# Patient Record
Sex: MALE | Race: WHITE | NOT HISPANIC OR LATINO | Employment: OTHER | URBAN - NONMETROPOLITAN AREA
[De-identification: names, ages, dates, MRNs, and addresses within clinical notes are randomized per-mention and may not be internally consistent; named-entity substitution may affect disease eponyms.]

---

## 2017-02-14 ENCOUNTER — OFFICE VISIT (OUTPATIENT)
Dept: WOUND CARE | Facility: HOSPITAL | Age: 82
End: 2017-02-14
Attending: PLASTIC SURGERY

## 2017-02-14 ENCOUNTER — LAB REQUISITION (OUTPATIENT)
Dept: LAB | Facility: HOSPITAL | Age: 82
End: 2017-02-14

## 2017-02-14 DIAGNOSIS — L02.611 CUTANEOUS ABSCESS OF RIGHT FOOT: ICD-10-CM

## 2017-02-14 PROCEDURE — 87147 CULTURE TYPE IMMUNOLOGIC: CPT | Performed by: PLASTIC SURGERY

## 2017-02-14 PROCEDURE — 87186 SC STD MICRODIL/AGAR DIL: CPT | Performed by: PLASTIC SURGERY

## 2017-02-14 PROCEDURE — 87205 SMEAR GRAM STAIN: CPT | Performed by: PLASTIC SURGERY

## 2017-02-14 PROCEDURE — 82962 GLUCOSE BLOOD TEST: CPT | Performed by: PLASTIC SURGERY

## 2017-02-14 PROCEDURE — 87070 CULTURE OTHR SPECIMN AEROBIC: CPT | Performed by: PLASTIC SURGERY

## 2017-02-14 PROCEDURE — 87077 CULTURE AEROBIC IDENTIFY: CPT | Performed by: PLASTIC SURGERY

## 2017-02-14 PROCEDURE — G0463 HOSPITAL OUTPT CLINIC VISIT: HCPCS

## 2017-02-15 LAB — GLUCOSE BLDC GLUCOMTR-MCNC: 135 MG/DL (ref 70–130)

## 2017-02-16 LAB
BACTERIA SPEC AEROBE CULT: ABNORMAL
GRAM STN SPEC: ABNORMAL
GRAM STN SPEC: ABNORMAL

## 2017-02-22 ENCOUNTER — APPOINTMENT (OUTPATIENT)
Dept: WOUND CARE | Facility: HOSPITAL | Age: 82
End: 2017-02-22
Attending: PLASTIC SURGERY

## 2017-02-22 PROCEDURE — 12032 INTMD RPR S/A/T/EXT 2.6-7.5: CPT

## 2017-03-01 ENCOUNTER — APPOINTMENT (OUTPATIENT)
Dept: WOUND CARE | Facility: HOSPITAL | Age: 82
End: 2017-03-01
Attending: PLASTIC SURGERY

## 2017-03-01 PROCEDURE — 12032 INTMD RPR S/A/T/EXT 2.6-7.5: CPT

## 2017-03-08 ENCOUNTER — APPOINTMENT (OUTPATIENT)
Dept: WOUND CARE | Facility: HOSPITAL | Age: 82
End: 2017-03-08
Attending: PLASTIC SURGERY

## 2017-03-15 ENCOUNTER — APPOINTMENT (OUTPATIENT)
Dept: WOUND CARE | Facility: HOSPITAL | Age: 82
End: 2017-03-15
Attending: PLASTIC SURGERY

## 2017-03-15 PROCEDURE — 11055 PARING/CUTG B9 HYPRKER LES 1: CPT

## 2017-03-22 ENCOUNTER — APPOINTMENT (OUTPATIENT)
Dept: WOUND CARE | Facility: HOSPITAL | Age: 82
End: 2017-03-22
Attending: PLASTIC SURGERY

## 2017-03-30 ENCOUNTER — APPOINTMENT (OUTPATIENT)
Dept: WOUND CARE | Facility: HOSPITAL | Age: 82
End: 2017-03-30
Attending: PLASTIC SURGERY

## 2017-03-31 ENCOUNTER — APPOINTMENT (OUTPATIENT)
Dept: WOUND CARE | Facility: HOSPITAL | Age: 82
End: 2017-03-31
Attending: PLASTIC SURGERY

## 2017-03-31 ENCOUNTER — TRANSCRIBE ORDERS (OUTPATIENT)
Dept: CARDIAC SURGERY | Facility: CLINIC | Age: 82
End: 2017-03-31

## 2017-03-31 DIAGNOSIS — I79.8 OTHER DISORDERS OF ARTERIES, ARTERIOLES AND CAPILLARIES IN DISEASES CLASSIFIED ELSEWHERE (HCC): Primary | ICD-10-CM

## 2017-03-31 PROCEDURE — G0463 HOSPITAL OUTPT CLINIC VISIT: HCPCS

## 2017-03-31 PROCEDURE — 99212 OFFICE O/P EST SF 10 MIN: CPT | Performed by: THORACIC SURGERY (CARDIOTHORACIC VASCULAR SURGERY)

## 2017-04-05 ENCOUNTER — APPOINTMENT (OUTPATIENT)
Dept: WOUND CARE | Facility: HOSPITAL | Age: 82
End: 2017-04-05
Attending: PLASTIC SURGERY

## 2017-04-05 ENCOUNTER — HOSPITAL ENCOUNTER (OUTPATIENT)
Dept: GENERAL RADIOLOGY | Facility: HOSPITAL | Age: 82
Discharge: HOME | End: 2017-04-05
Admitting: THORACIC SURGERY (CARDIOTHORACIC VASCULAR SURGERY)

## 2017-04-05 DIAGNOSIS — M86.071 ACUTE HEMATOGENOUS OSTEOMYELITIS OF RIGHT FOOT (HCC): ICD-10-CM

## 2017-04-05 PROCEDURE — G0463 HOSPITAL OUTPT CLINIC VISIT: HCPCS

## 2017-04-05 PROCEDURE — 73630 X-RAY EXAM OF FOOT: CPT

## 2017-04-12 ENCOUNTER — APPOINTMENT (OUTPATIENT)
Dept: WOUND CARE | Facility: HOSPITAL | Age: 82
End: 2017-04-12
Attending: PLASTIC SURGERY

## 2017-04-19 ENCOUNTER — APPOINTMENT (OUTPATIENT)
Dept: WOUND CARE | Facility: HOSPITAL | Age: 82
End: 2017-04-19
Attending: PLASTIC SURGERY

## 2017-04-19 PROCEDURE — G0463 HOSPITAL OUTPT CLINIC VISIT: HCPCS

## 2017-04-26 ENCOUNTER — APPOINTMENT (OUTPATIENT)
Dept: WOUND CARE | Facility: HOSPITAL | Age: 82
End: 2017-04-26
Attending: PLASTIC SURGERY

## 2017-04-26 PROCEDURE — G0463 HOSPITAL OUTPT CLINIC VISIT: HCPCS

## 2017-05-03 ENCOUNTER — OFFICE VISIT (OUTPATIENT)
Dept: WOUND CARE | Facility: HOSPITAL | Age: 82
End: 2017-05-03
Attending: PLASTIC SURGERY

## 2017-05-03 PROCEDURE — G0463 HOSPITAL OUTPT CLINIC VISIT: HCPCS

## 2017-05-10 ENCOUNTER — APPOINTMENT (OUTPATIENT)
Dept: WOUND CARE | Facility: HOSPITAL | Age: 82
End: 2017-05-10
Attending: PLASTIC SURGERY

## 2017-05-10 PROCEDURE — G0463 HOSPITAL OUTPT CLINIC VISIT: HCPCS

## 2017-05-17 ENCOUNTER — APPOINTMENT (OUTPATIENT)
Dept: WOUND CARE | Facility: HOSPITAL | Age: 82
End: 2017-05-17
Attending: PLASTIC SURGERY

## 2017-05-17 PROCEDURE — G0463 HOSPITAL OUTPT CLINIC VISIT: HCPCS

## 2017-05-23 ENCOUNTER — APPOINTMENT (OUTPATIENT)
Dept: WOUND CARE | Facility: HOSPITAL | Age: 82
End: 2017-05-23
Attending: PLASTIC SURGERY

## 2017-05-23 PROCEDURE — G0463 HOSPITAL OUTPT CLINIC VISIT: HCPCS

## 2017-06-06 ENCOUNTER — APPOINTMENT (OUTPATIENT)
Dept: WOUND CARE | Facility: HOSPITAL | Age: 82
End: 2017-06-06
Attending: PLASTIC SURGERY

## 2017-06-06 PROCEDURE — G0463 HOSPITAL OUTPT CLINIC VISIT: HCPCS

## 2017-06-20 ENCOUNTER — APPOINTMENT (OUTPATIENT)
Dept: WOUND CARE | Facility: HOSPITAL | Age: 82
End: 2017-06-20
Attending: PLASTIC SURGERY

## 2017-06-29 ENCOUNTER — APPOINTMENT (OUTPATIENT)
Dept: WOUND CARE | Facility: HOSPITAL | Age: 82
End: 2017-06-29
Attending: PLASTIC SURGERY

## 2017-06-29 ENCOUNTER — HOSPITAL ENCOUNTER (OUTPATIENT)
Dept: GENERAL RADIOLOGY | Facility: HOSPITAL | Age: 82
Discharge: HOME | End: 2017-06-29
Admitting: PLASTIC SURGERY

## 2017-06-29 ENCOUNTER — LAB REQUISITION (OUTPATIENT)
Dept: LAB | Facility: HOSPITAL | Age: 82
End: 2017-06-29

## 2017-06-29 DIAGNOSIS — L02.611 ABSCESS OF FIFTH TOE OF RIGHT FOOT: ICD-10-CM

## 2017-06-29 DIAGNOSIS — E11.621 TYPE 2 DIABETES MELLITUS WITH FOOT ULCER (CODE) (HCC): ICD-10-CM

## 2017-06-29 PROCEDURE — 87070 CULTURE OTHR SPECIMN AEROBIC: CPT | Performed by: PLASTIC SURGERY

## 2017-06-29 PROCEDURE — 87077 CULTURE AEROBIC IDENTIFY: CPT | Performed by: PLASTIC SURGERY

## 2017-06-29 PROCEDURE — 87186 SC STD MICRODIL/AGAR DIL: CPT | Performed by: PLASTIC SURGERY

## 2017-06-29 PROCEDURE — 87205 SMEAR GRAM STAIN: CPT | Performed by: PLASTIC SURGERY

## 2017-06-29 PROCEDURE — 87147 CULTURE TYPE IMMUNOLOGIC: CPT | Performed by: PLASTIC SURGERY

## 2017-06-29 PROCEDURE — 73630 X-RAY EXAM OF FOOT: CPT

## 2017-07-04 LAB
BACTERIA SPEC AEROBE CULT: ABNORMAL
GRAM STN SPEC: ABNORMAL
GRAM STN SPEC: ABNORMAL

## 2017-07-05 ENCOUNTER — APPOINTMENT (OUTPATIENT)
Dept: WOUND CARE | Facility: HOSPITAL | Age: 82
End: 2017-07-05
Attending: PLASTIC SURGERY

## 2017-07-12 ENCOUNTER — OFFICE VISIT (OUTPATIENT)
Dept: WOUND CARE | Facility: HOSPITAL | Age: 82
End: 2017-07-12
Attending: PLASTIC SURGERY

## 2017-07-12 DIAGNOSIS — R00.1 BRADYCARDIA: Primary | ICD-10-CM

## 2017-07-12 DIAGNOSIS — I95.9 HYPOTENSION, UNSPECIFIED: ICD-10-CM

## 2017-07-12 LAB — GLUCOSE BLDC GLUCOMTR-MCNC: 150 MG/DL (ref 70–130)

## 2017-07-12 PROCEDURE — 82962 GLUCOSE BLOOD TEST: CPT | Performed by: PLASTIC SURGERY

## 2017-07-12 PROCEDURE — G0463 HOSPITAL OUTPT CLINIC VISIT: HCPCS

## 2017-07-12 PROCEDURE — 93005 ELECTROCARDIOGRAM TRACING: CPT | Performed by: PLASTIC SURGERY

## 2017-07-12 PROCEDURE — 93010 ELECTROCARDIOGRAM REPORT: CPT | Performed by: INTERNAL MEDICINE

## 2017-07-26 ENCOUNTER — APPOINTMENT (OUTPATIENT)
Dept: WOUND CARE | Facility: HOSPITAL | Age: 82
End: 2017-07-26
Attending: PLASTIC SURGERY

## 2017-07-26 PROCEDURE — G0463 HOSPITAL OUTPT CLINIC VISIT: HCPCS

## 2017-08-09 ENCOUNTER — LAB REQUISITION (OUTPATIENT)
Dept: LAB | Facility: HOSPITAL | Age: 82
End: 2017-08-09

## 2017-08-09 ENCOUNTER — APPOINTMENT (OUTPATIENT)
Dept: WOUND CARE | Facility: HOSPITAL | Age: 82
End: 2017-08-09
Attending: PLASTIC SURGERY

## 2017-08-09 DIAGNOSIS — S91.104A: ICD-10-CM

## 2017-08-09 PROCEDURE — 87205 SMEAR GRAM STAIN: CPT | Performed by: PLASTIC SURGERY

## 2017-08-09 PROCEDURE — 11055 PARING/CUTG B9 HYPRKER LES 1: CPT

## 2017-08-09 PROCEDURE — 87070 CULTURE OTHR SPECIMN AEROBIC: CPT | Performed by: PLASTIC SURGERY

## 2017-08-14 LAB
BACTERIA SPEC AEROBE CULT: NORMAL
GRAM STN SPEC: NORMAL
GRAM STN SPEC: NORMAL

## 2017-08-22 ENCOUNTER — LAB REQUISITION (OUTPATIENT)
Dept: LAB | Facility: HOSPITAL | Age: 82
End: 2017-08-22

## 2017-08-22 ENCOUNTER — APPOINTMENT (OUTPATIENT)
Dept: WOUND CARE | Facility: HOSPITAL | Age: 82
End: 2017-08-22
Attending: PLASTIC SURGERY

## 2017-08-22 DIAGNOSIS — S91.104A: ICD-10-CM

## 2017-08-22 PROCEDURE — 87077 CULTURE AEROBIC IDENTIFY: CPT | Performed by: PLASTIC SURGERY

## 2017-08-22 PROCEDURE — G0463 HOSPITAL OUTPT CLINIC VISIT: HCPCS

## 2017-08-22 PROCEDURE — 87205 SMEAR GRAM STAIN: CPT | Performed by: PLASTIC SURGERY

## 2017-08-22 PROCEDURE — 87070 CULTURE OTHR SPECIMN AEROBIC: CPT | Performed by: PLASTIC SURGERY

## 2017-08-22 PROCEDURE — 87186 SC STD MICRODIL/AGAR DIL: CPT | Performed by: PLASTIC SURGERY

## 2017-08-22 PROCEDURE — 87147 CULTURE TYPE IMMUNOLOGIC: CPT | Performed by: PLASTIC SURGERY

## 2017-08-25 LAB
BACTERIA SPEC AEROBE CULT: ABNORMAL
BACTERIA SPEC AEROBE CULT: ABNORMAL
GRAM STN SPEC: ABNORMAL
GRAM STN SPEC: ABNORMAL

## 2017-09-05 ENCOUNTER — APPOINTMENT (OUTPATIENT)
Dept: WOUND CARE | Facility: HOSPITAL | Age: 82
End: 2017-09-05
Attending: PLASTIC SURGERY

## 2017-09-06 ENCOUNTER — APPOINTMENT (OUTPATIENT)
Dept: WOUND CARE | Facility: HOSPITAL | Age: 82
End: 2017-09-06
Attending: PLASTIC SURGERY

## 2017-09-07 ENCOUNTER — APPOINTMENT (OUTPATIENT)
Dept: WOUND CARE | Facility: HOSPITAL | Age: 82
End: 2017-09-07
Attending: PLASTIC SURGERY

## 2017-09-12 ENCOUNTER — OFFICE VISIT (OUTPATIENT)
Dept: PODIATRY | Facility: CLINIC | Age: 82
End: 2017-09-12

## 2017-09-12 VITALS
OXYGEN SATURATION: 98 % | BODY MASS INDEX: 27.16 KG/M2 | SYSTOLIC BLOOD PRESSURE: 174 MMHG | HEART RATE: 60 BPM | WEIGHT: 163 LBS | DIASTOLIC BLOOD PRESSURE: 84 MMHG | HEIGHT: 65 IN

## 2017-09-12 DIAGNOSIS — M20.41 HAMMER TOE OF RIGHT FOOT: ICD-10-CM

## 2017-09-12 DIAGNOSIS — L97.514: Primary | ICD-10-CM

## 2017-09-12 PROCEDURE — 99203 OFFICE O/P NEW LOW 30 MIN: CPT | Performed by: PODIATRIST

## 2017-09-12 PROCEDURE — 11042 DBRDMT SUBQ TIS 1ST 20SQCM/<: CPT | Performed by: PODIATRIST

## 2017-09-12 RX ORDER — POTASSIUM CHLORIDE 750 MG/1
CAPSULE, EXTENDED RELEASE ORAL
Refills: 2 | COMMUNITY
Start: 2017-08-24

## 2017-09-12 RX ORDER — LEVOTHYROXINE SODIUM 25 MCG
TABLET ORAL
Refills: 0 | COMMUNITY
Start: 2017-08-21

## 2017-09-12 RX ORDER — ALLOPURINOL 300 MG/1
TABLET ORAL
Refills: 5 | COMMUNITY
Start: 2017-08-21

## 2017-09-12 RX ORDER — CARVEDILOL 3.12 MG/1
TABLET ORAL
Refills: 5 | COMMUNITY
Start: 2017-08-21

## 2017-09-12 RX ORDER — AMIODARONE HYDROCHLORIDE 200 MG/1
TABLET ORAL
Refills: 5 | COMMUNITY
Start: 2017-08-28

## 2017-09-12 RX ORDER — WARFARIN SODIUM 2.5 MG/1
TABLET ORAL
Refills: 2 | COMMUNITY
Start: 2017-08-07

## 2017-09-12 NOTE — PROGRESS NOTES
"Lucien Stone  1/28/1932  85 y.o. male    9/12/17    Chief Complaint   Patient presents with   • Right Foot - Wound Check, Nail Problem   • Left Foot - Nail Problem           History of Present Illness    85-year-old male presents to clinic today with chief complaint of a nonhealing wound to his right third toe.  He states he has had the wound since February.  He has been being treated in the wound care center.  They have been doing debridements and dressings with antibiotic ointment.  He was referred here for shoe modifications to help heal the wound.  He also complains of a sore left great toe.  He believes he has an ingrowing toenail.  He has been bothering him for several weeks.  He has done nothing to treat it.  He has no other pedal complaints.         No past medical history on file.      No past surgical history on file.      No family history on file.      Social History     Social History   • Marital status: Unknown     Spouse name: N/A   • Number of children: N/A   • Years of education: N/A     Occupational History   • Not on file.     Social History Main Topics   • Smoking status: Never Smoker   • Smokeless tobacco: Not on file   • Alcohol use No   • Drug use: Not on file   • Sexual activity: Defer     Other Topics Concern   • Not on file     Social History Narrative   • No narrative on file         Current Outpatient Prescriptions   Medication Sig Dispense Refill   • allopurinol (ZYLOPRIM) 300 MG tablet TK 1 T PO QD  5   • amiodarone (PACERONE) 200 MG tablet TK 1 T PO D  5   • carvedilol (COREG) 3.125 MG tablet TK 1 T PO BID WITH MEALS  5   • potassium chloride (MICRO-K) 10 MEQ CR capsule TK ONE C PO  D WITH FOOD  2   • SYNTHROID 25 MCG tablet TK 1 T PO D  0   • warfarin (COUMADIN) 2.5 MG tablet TK 1 T PO QD UTD BY YOUR DOCTOR  2     No current facility-administered medications for this visit.          OBJECTIVE    /84  Pulse 60  Ht 65\" (165.1 cm)  Wt 163 lb (73.9 kg)  SpO2 98%  BMI 27.12 " kg/m2      Review of Systems   Constitutional: Negative for chills and fever.   Cardiovascular: Negative for chest pain.   Gastrointestinal: Negative for constipation, diarrhea, nausea and vomiting.   Skin: Ulcer right third toe   Musculoskeletal: Left great toe pain      Constitutional: well developed, well nourished    HEENT: Normocephalic and atraumatic, normal hearing    Respiratory: Non labored respirations noted    Cardiovascular:    DP/PT pulses non-palpable    CFT brisk  to all digits  Skin temp is warm to warm from proximal tibia to distal digits  No erythema or edema noted     Musculoskeletal:  Muscle strength is 5/5 for all muscle groups tested   ROM of the 1st MTP is full without pain or crepitus   ROM of the ankle joint is full without pain or crepitus    Mild tenderness palpation to the left hallux  Reducibly contracted digits 2 through 5 bilateral    Dermatological:   Nails 1-5 right and 2 through 5 left are within normal limits for length and thickness, left hallux nail is absent from previous nail avulsion    Webspaces 1-4 bilateral are clean, dry and intact.   No subcutaneous nodules or masses noted    Full thickness ulceration noted to the distal tip of the right third toe.  Ulcer measures 0.4 x 0.5 x 0.4 cm.  It probes to bone.  There is no surrounding erythema, purulent drainage or foul odor noted.    Neurological:   Protective sensation decreased  Sensation intact to light touch    DTR intact    Psychiatric: A&O x 3 with normal mood and affect. NAD.     Procedures        ASSESSMENT AND PLAN    Lucien was seen today for wound check, nail problem and nail problem.    Diagnoses and all orders for this visit:    Chronic ulcer of toe, right, with necrosis of bone    Hammer toe of right foot    - Comprehensive foot and ankle exam performed.   - Reviewed old records and imaging, erosion noted to the distal phalanx of the third digit on radiographs  - Debrided ulcer noted in objective with a #15 blade  down to subcutaneous tissue.  Post-debridement measurements are 0.4 x 0.5 x 0.4cm.  Ulcer was dressed with medihoney and a dsd. Pt to do daily dressing changes at home  - Dispensed crest pad to aid in offloading of the third digit.  Brief discussion was held with patient regarding surgical intervention for nonhealing ulcer to the third digit.  - All questions were answered and the patient is in agreement with the current treatment plan.  - RTC  1 week          This document has been electronically signed by Dennis Rose DPM on September 13, 2017 5:54 PM     9/13/2017  5:54 PM    EMR Dragon/Transcription disclaimer:   Much of this encounter note is an electronic transcription/translation of spoken language to printed text. The electronic translation of spoken language may permit erroneous, or at times, nonsensical words or phrases to be inadvertently transcribed; Although I have reviewed the note for such errors, some may still exist.

## 2017-09-26 ENCOUNTER — OFFICE VISIT (OUTPATIENT)
Dept: PODIATRY | Facility: CLINIC | Age: 82
End: 2017-09-26

## 2017-09-26 VITALS — WEIGHT: 163 LBS | BODY MASS INDEX: 27.16 KG/M2 | HEIGHT: 65 IN

## 2017-09-26 DIAGNOSIS — R09.89 ABSENT PEDAL PULSES: Primary | ICD-10-CM

## 2017-09-26 DIAGNOSIS — L97.514: ICD-10-CM

## 2017-09-26 PROCEDURE — 11042 DBRDMT SUBQ TIS 1ST 20SQCM/<: CPT | Performed by: PODIATRIST

## 2017-09-26 RX ORDER — ENALAPRIL MALEATE 10 MG/1
5 TABLET ORAL DAILY
COMMUNITY
Start: 2017-09-18

## 2017-09-26 NOTE — PROGRESS NOTES
"Lucien Stone  1/28/1932  85 y.o. male     Patient presents today for follow-up on his right 3rd toe.    9/26/17    Chief Complaint   Patient presents with   • Right Foot - Follow-up           History of Present Illness    Patient presents to clinic today accompanied by his nephew for follow-up for his right third digit ulcer.  They have been dressing the toe as instructed.  He is currently weightbearing in offloading surgical shoe.  He has no new pedal complaints.      No past medical history on file.      No past surgical history on file.      No family history on file.      Social History     Social History   • Marital status: Unknown     Spouse name: N/A   • Number of children: N/A   • Years of education: N/A     Occupational History   • Not on file.     Social History Main Topics   • Smoking status: Never Smoker   • Smokeless tobacco: Not on file   • Alcohol use No   • Drug use: Not on file   • Sexual activity: Defer     Other Topics Concern   • Not on file     Social History Narrative   • No narrative on file         Current Outpatient Prescriptions   Medication Sig Dispense Refill   • allopurinol (ZYLOPRIM) 300 MG tablet TK 1 T PO QD  5   • amiodarone (PACERONE) 200 MG tablet TK 1 T PO D  5   • carvedilol (COREG) 3.125 MG tablet TK 1 T PO BID WITH MEALS  5   • potassium chloride (MICRO-K) 10 MEQ CR capsule TK ONE C PO  D WITH FOOD  2   • SYNTHROID 25 MCG tablet TK 1 T PO D  0   • warfarin (COUMADIN) 2.5 MG tablet TK 1 T PO QD UTD BY YOUR DOCTOR  2   • enalapril (VASOTEC) 10 MG tablet Take 5 mg by mouth Daily. 1/2 tab daily       No current facility-administered medications for this visit.          OBJECTIVE    Ht 65\" (165.1 cm)  Wt 163 lb (73.9 kg)  BMI 27.12 kg/m2      Review of Systems   Constitutional: Negative for chills and fever.   Cardiovascular: Negative for chest pain.   Gastrointestinal: Negative for constipation, diarrhea, nausea and vomiting.   Skin: Ulcer right third toe       Constitutional: " well developed, well nourished    HEENT: Normocephalic and atraumatic, normal hearing    Respiratory: Non labored respirations noted    Cardiovascular:    DP/PT pulses non-palpable    CFT brisk  to all digits  Skin temp is warm to warm from proximal tibia to distal digits  No erythema or edema noted     Musculoskeletal:  Muscle strength is 5/5 for all muscle groups tested   ROM of the 1st MTP is full without pain or crepitus   ROM of the ankle joint is full without pain or crepitus    Mild tenderness palpation to the left hallux  Reducibly contracted digits 2 through 5 bilateral    Dermatological:   Webspaces 1-4 bilateral are clean, dry and intact.   No subcutaneous nodules or masses noted    Full thickness ulceration noted to the distal tip of the right third toe.  Ulcer measures 0.3 x 0.3 x 0.4 cm.  It probes to bone.  There is no surrounding erythema, purulent drainage or foul odor noted.    Neurological:   Protective sensation decreased  Sensation intact to light touch    DTR intact    Psychiatric: A&O x 3 with normal mood and affect. NAD.     Procedures        ASSESSMENT AND PLAN    Lucien was seen today for follow-up.    Diagnoses and all orders for this visit:    Absent pedal pulses  -     Doppler Ankle Brachial Index Single Level CAR; Future    Chronic ulcer of toe, right, with necrosis of bone    - Debrided ulcer noted in objective with a #15 blade down to subcutaneous tissue.  Post-debridement measurements are 0.4 x 0.5 x 0.4cm.  Ulcer was dressed with medihoney and a dsd. Pt to do daily dressing changes at home  - cont wbat in offloaded surgical shoe  - Lengthy discussion was held patient regarding continued conservative care for the right third toe ulcer versus surgical intervention.  Ordered noninvasive vascular testing to evaluate healing potential of possible partial third digit amputation  - All questions were answered and the patient is in agreement with the current treatment plan.  - RTC  1  week          This document has been electronically signed by Dennis Rose DPM on September 27, 2017 7:38 AM     9/27/2017  7:38 AM    EMR Dragon/Transcription disclaimer:   Much of this encounter note is an electronic transcription/translation of spoken language to printed text. The electronic translation of spoken language may permit erroneous, or at times, nonsensical words or phrases to be inadvertently transcribed; Although I have reviewed the note for such errors, some may still exist.

## 2017-10-09 ENCOUNTER — OFFICE VISIT (OUTPATIENT)
Dept: PODIATRY | Facility: CLINIC | Age: 82
End: 2017-10-09

## 2017-10-09 VITALS — HEIGHT: 65 IN | WEIGHT: 163 LBS | BODY MASS INDEX: 27.16 KG/M2

## 2017-10-09 DIAGNOSIS — L97.514: ICD-10-CM

## 2017-10-09 DIAGNOSIS — R09.89 ABSENT PEDAL PULSES: Primary | ICD-10-CM

## 2017-10-09 DIAGNOSIS — M20.41 HAMMER TOE OF RIGHT FOOT: ICD-10-CM

## 2017-10-09 PROCEDURE — 99213 OFFICE O/P EST LOW 20 MIN: CPT | Performed by: PODIATRIST

## 2017-10-09 NOTE — PROGRESS NOTES
Lucien Stone  1/28/1932  85 y.o. male     Patient presents today for follow-up on his right 3rd toe.        Chief Complaint   Patient presents with   • Right Foot - Wound Check           History of Present Illness    Patient presents to clinic today accompanied by his nephew for follow-up for his right third digit ulcer.  They have been dressing the toe as instructed.  He is currently weightbearing in offloading surgical shoe. He has had his non-invasive vascular tests. He has no new pedal complaints.      Past Medical History:   Diagnosis Date   • Bleeding disorder    • Cancer    • Chronic kidney disease    • History of transfusion    • Hypertension    • Myocardial infarction          Past Surgical History:   Procedure Laterality Date   • ANGIOPLASTY     • CHOLECYSTECTOMY     • CORONARY STENT PLACEMENT     • HERNIA REPAIR           Family History   Problem Relation Age of Onset   • No Known Problems Mother    • Stroke Father    • Cancer Brother    • No Known Problems Maternal Grandmother    • No Known Problems Maternal Grandfather    • No Known Problems Paternal Grandmother    • No Known Problems Paternal Grandfather          Social History     Social History   • Marital status: Unknown     Spouse name: N/A   • Number of children: N/A   • Years of education: N/A     Occupational History   • Not on file.     Social History Main Topics   • Smoking status: Never Smoker   • Smokeless tobacco: Not on file   • Alcohol use No   • Drug use: No   • Sexual activity: No     Other Topics Concern   • Not on file     Social History Narrative         Current Outpatient Prescriptions   Medication Sig Dispense Refill   • allopurinol (ZYLOPRIM) 300 MG tablet TK 1 T PO QD  5   • amiodarone (PACERONE) 200 MG tablet TK 1 T PO D  5   • carvedilol (COREG) 3.125 MG tablet TK 1 T PO BID WITH MEALS  5   • enalapril (VASOTEC) 10 MG tablet Take 5 mg by mouth Daily. 1/2 tab daily     • potassium chloride (MICRO-K) 10 MEQ CR capsule TK ONE C PO  " D WITH FOOD  2   • SYNTHROID 25 MCG tablet TK 1 T PO D  0   • warfarin (COUMADIN) 2.5 MG tablet TK 1 T PO QD UTD BY YOUR DOCTOR  2     No current facility-administered medications for this visit.          OBJECTIVE    Ht 65\" (165.1 cm)  Wt 163 lb (73.9 kg)  BMI 27.12 kg/m2      Review of Systems   Constitutional: Negative for chills and fever.   Cardiovascular: Negative for chest pain.   Gastrointestinal: Negative for constipation, diarrhea, nausea and vomiting.   Skin: Ulcer right third toe       Constitutional: well developed, well nourished    HEENT: Normocephalic and atraumatic, normal hearing    Respiratory: Non labored respirations noted    Cardiovascular:    DP/PT pulses non-palpable    CFT brisk  to all digits  Skin temp is warm to warm from proximal tibia to distal digits  No erythema or edema noted     Musculoskeletal:  Muscle strength is 5/5 for all muscle groups tested   ROM of the 1st MTP is full without pain or crepitus   ROM of the ankle joint is full without pain or crepitus    Mild tenderness palpation to the left hallux  Reducibly contracted digits 2 through 5 bilateral    Dermatological:   Webspaces 1-4 bilateral are clean, dry and intact.   No subcutaneous nodules or masses noted    Full thickness ulceration noted to the distal tip of the right third toe is callused over at this time    Neurological:   Protective sensation decreased  Sensation intact to light touch    DTR intact    Psychiatric: A&O x 3 with normal mood and affect. NAD.     Procedures        ASSESSMENT AND PLAN    Lucien was seen today for wound check.    Diagnoses and all orders for this visit:    Absent pedal pulses    Chronic ulcer of toe, right, with necrosis of bone    Hammer toe of right foot    - reviewed vascular studies   - monophasic DP right and biphasic PT right   - indices are moderately reduced  - cont wbat in offloaded surgical shoe  - daily dressing changes at home  - referral to vascular surgery for evaluation  - " All questions were answered   - RTC  After vascular surgery consult          This document has been electronically signed by Dennis Rose DPM on October 9, 2017 2:08 PM     10/9/2017  2:08 PM    EMR Dragon/Transcription disclaimer:   Much of this encounter note is an electronic transcription/translation of spoken language to printed text. The electronic translation of spoken language may permit erroneous, or at times, nonsensical words or phrases to be inadvertently transcribed; Although I have reviewed the note for such errors, some may still exist.

## 2018-01-03 ENCOUNTER — HOSPITAL ENCOUNTER (OUTPATIENT)
Facility: HOSPITAL | Age: 83
Setting detail: OBSERVATION
Discharge: ANOTHER HOSPITAL | End: 2018-01-04
Attending: EMERGENCY MEDICINE | Admitting: INTERNAL MEDICINE

## 2018-01-03 ENCOUNTER — APPOINTMENT (OUTPATIENT)
Dept: GENERAL RADIOLOGY | Facility: HOSPITAL | Age: 83
End: 2018-01-03

## 2018-01-03 ENCOUNTER — APPOINTMENT (OUTPATIENT)
Dept: CT IMAGING | Facility: HOSPITAL | Age: 83
End: 2018-01-03

## 2018-01-03 DIAGNOSIS — R00.0 TACHYCARDIA: ICD-10-CM

## 2018-01-03 DIAGNOSIS — R77.8 ELEVATED TROPONIN: Primary | ICD-10-CM

## 2018-01-03 LAB
ALBUMIN SERPL-MCNC: 3.1 G/DL (ref 3.4–4.8)
ALBUMIN/GLOB SERPL: 1.1 G/DL (ref 1.1–1.8)
ALP SERPL-CCNC: 58 U/L (ref 38–126)
ALT SERPL W P-5'-P-CCNC: 37 U/L (ref 21–72)
ANION GAP SERPL CALCULATED.3IONS-SCNC: 9 MMOL/L (ref 5–15)
APTT PPP: 39.5 SECONDS (ref 20–40.3)
AST SERPL-CCNC: 30 U/L (ref 17–59)
BASOPHILS # BLD AUTO: 0.03 10*3/MM3 (ref 0–0.2)
BASOPHILS NFR BLD AUTO: 0.5 % (ref 0–2)
BILIRUB SERPL-MCNC: 0.2 MG/DL (ref 0.2–1.3)
BUN BLD-MCNC: 26 MG/DL (ref 7–21)
BUN/CREAT SERPL: 20.5 (ref 7–25)
CALCIUM SPEC-SCNC: 8.6 MG/DL (ref 8.4–10.2)
CHLORIDE SERPL-SCNC: 102 MMOL/L (ref 95–110)
CO2 SERPL-SCNC: 30 MMOL/L (ref 22–31)
CREAT BLD-MCNC: 1.27 MG/DL (ref 0.7–1.3)
D-DIMER, QUANTITATIVE (MAD,POW, STR): 1051 NG/ML (FEU) (ref 0–470)
DEPRECATED RDW RBC AUTO: 61.8 FL (ref 35.1–43.9)
EOSINOPHIL # BLD AUTO: 0.05 10*3/MM3 (ref 0–0.7)
EOSINOPHIL NFR BLD AUTO: 0.8 % (ref 0–7)
ERYTHROCYTE [DISTWIDTH] IN BLOOD BY AUTOMATED COUNT: 17.7 % (ref 11.5–14.5)
GFR SERPL CREATININE-BSD FRML MDRD: 54 ML/MIN/1.73 (ref 60–98)
GLOBULIN UR ELPH-MCNC: 2.7 GM/DL (ref 2.3–3.5)
GLUCOSE BLD-MCNC: 113 MG/DL (ref 60–100)
HCT VFR BLD AUTO: 29.7 % (ref 39–49)
HGB BLD-MCNC: 9.8 G/DL (ref 13.7–17.3)
HOLD SPECIMEN: NORMAL
IMM GRANULOCYTES # BLD: 0.02 10*3/MM3 (ref 0–0.02)
IMM GRANULOCYTES NFR BLD: 0.3 % (ref 0–0.5)
INR PPP: 2.42 (ref 0.8–1.2)
LYMPHOCYTES # BLD AUTO: 1.37 10*3/MM3 (ref 0.6–4.2)
LYMPHOCYTES NFR BLD AUTO: 21 % (ref 10–50)
MCH RBC QN AUTO: 31.1 PG (ref 26.5–34)
MCHC RBC AUTO-ENTMCNC: 33 G/DL (ref 31.5–36.3)
MCV RBC AUTO: 94.3 FL (ref 80–98)
MONOCYTES # BLD AUTO: 0.84 10*3/MM3 (ref 0–0.9)
MONOCYTES NFR BLD AUTO: 12.9 % (ref 0–12)
NEUTROPHILS # BLD AUTO: 4.2 10*3/MM3 (ref 2–8.6)
NEUTROPHILS NFR BLD AUTO: 64.5 % (ref 37–80)
NT-PROBNP SERPL-MCNC: 4970 PG/ML (ref 0–1800)
PLATELET # BLD AUTO: 159 10*3/MM3 (ref 150–450)
PMV BLD AUTO: 10.9 FL (ref 8–12)
POTASSIUM BLD-SCNC: 3.6 MMOL/L (ref 3.5–5.1)
PROT SERPL-MCNC: 5.8 G/DL (ref 6.3–8.6)
PROTHROMBIN TIME: 26.6 SECONDS (ref 11.1–15.3)
RBC # BLD AUTO: 3.15 10*6/MM3 (ref 4.37–5.74)
SODIUM BLD-SCNC: 141 MMOL/L (ref 137–145)
TROPONIN I SERPL-MCNC: 0.51 NG/ML
WBC NRBC COR # BLD: 6.51 10*3/MM3 (ref 3.2–9.8)
WHOLE BLOOD HOLD SPECIMEN: NORMAL
WHOLE BLOOD HOLD SPECIMEN: NORMAL

## 2018-01-03 PROCEDURE — 93010 ELECTROCARDIOGRAM REPORT: CPT | Performed by: INTERNAL MEDICINE

## 2018-01-03 PROCEDURE — 85379 FIBRIN DEGRADATION QUANT: CPT | Performed by: EMERGENCY MEDICINE

## 2018-01-03 PROCEDURE — 85730 THROMBOPLASTIN TIME PARTIAL: CPT | Performed by: EMERGENCY MEDICINE

## 2018-01-03 PROCEDURE — 80053 COMPREHEN METABOLIC PANEL: CPT | Performed by: EMERGENCY MEDICINE

## 2018-01-03 PROCEDURE — 85025 COMPLETE CBC W/AUTO DIFF WBC: CPT | Performed by: EMERGENCY MEDICINE

## 2018-01-03 PROCEDURE — 99284 EMERGENCY DEPT VISIT MOD MDM: CPT

## 2018-01-03 PROCEDURE — 83880 ASSAY OF NATRIURETIC PEPTIDE: CPT | Performed by: EMERGENCY MEDICINE

## 2018-01-03 PROCEDURE — 84484 ASSAY OF TROPONIN QUANT: CPT | Performed by: EMERGENCY MEDICINE

## 2018-01-03 PROCEDURE — 85610 PROTHROMBIN TIME: CPT | Performed by: EMERGENCY MEDICINE

## 2018-01-03 PROCEDURE — 93005 ELECTROCARDIOGRAM TRACING: CPT | Performed by: EMERGENCY MEDICINE

## 2018-01-03 PROCEDURE — 83874 ASSAY OF MYOGLOBIN: CPT | Performed by: EMERGENCY MEDICINE

## 2018-01-03 PROCEDURE — 71045 X-RAY EXAM CHEST 1 VIEW: CPT

## 2018-01-03 RX ORDER — SODIUM CHLORIDE 0.9 % (FLUSH) 0.9 %
10 SYRINGE (ML) INJECTION AS NEEDED
Status: DISCONTINUED | OUTPATIENT
Start: 2018-01-03 | End: 2018-01-05 | Stop reason: HOSPADM

## 2018-01-04 ENCOUNTER — APPOINTMENT (OUTPATIENT)
Dept: NUCLEAR MEDICINE | Facility: HOSPITAL | Age: 83
End: 2018-01-04

## 2018-01-04 VITALS
BODY MASS INDEX: 26.69 KG/M2 | OXYGEN SATURATION: 99 % | RESPIRATION RATE: 18 BRPM | HEART RATE: 50 BPM | DIASTOLIC BLOOD PRESSURE: 54 MMHG | TEMPERATURE: 97.7 F | WEIGHT: 156.31 LBS | HEIGHT: 64 IN | SYSTOLIC BLOOD PRESSURE: 132 MMHG

## 2018-01-04 PROBLEM — I25.5 ISCHEMIC CARDIOMYOPATHY: Chronic | Status: ACTIVE | Noted: 2018-01-04

## 2018-01-04 PROBLEM — R07.9 CHEST PAIN: Status: ACTIVE | Noted: 2018-01-04

## 2018-01-04 PROBLEM — I47.20 VENTRICULAR TACHYARRHYTHMIA (HCC): Chronic | Status: ACTIVE | Noted: 2018-01-04

## 2018-01-04 PROBLEM — I25.10 CAD (CORONARY ARTERY DISEASE): Chronic | Status: ACTIVE | Noted: 2018-01-04

## 2018-01-04 PROBLEM — Z45.02 AICD DISCHARGE: Chronic | Status: ACTIVE | Noted: 2018-01-04

## 2018-01-04 PROBLEM — I50.22 CHRONIC SYSTOLIC HEART FAILURE (HCC): Chronic | Status: ACTIVE | Noted: 2018-01-04

## 2018-01-04 LAB
ANION GAP SERPL CALCULATED.3IONS-SCNC: 7 MMOL/L (ref 5–15)
BUN BLD-MCNC: 26 MG/DL (ref 7–21)
BUN/CREAT SERPL: 19.7 (ref 7–25)
CALCIUM SPEC-SCNC: 8.4 MG/DL (ref 8.4–10.2)
CHLORIDE SERPL-SCNC: 104 MMOL/L (ref 95–110)
CO2 SERPL-SCNC: 28 MMOL/L (ref 22–31)
CRE SCREEN PCR: NOT DETECTED
CREAT BLD-MCNC: 1.32 MG/DL (ref 0.7–1.3)
GFR SERPL CREATININE-BSD FRML MDRD: 52 ML/MIN/1.73 (ref 42–98)
GLUCOSE BLD-MCNC: 84 MG/DL (ref 60–100)
IMP STRAIN: NOT DETECTED
INR PPP: 2.33 (ref 0.8–1.2)
INR PPP: 2.47 (ref 0.8–1.2)
KPC STRAIN: NOT DETECTED
MAGNESIUM SERPL-MCNC: 1.8 MG/DL (ref 1.6–2.3)
NDM STRAIN: NOT DETECTED
OXA 48 STRAIN: NOT DETECTED
POTASSIUM BLD-SCNC: 3.2 MMOL/L (ref 3.5–5.1)
PROTHROMBIN TIME: 25.8 SECONDS (ref 11.1–15.3)
PROTHROMBIN TIME: 27 SECONDS (ref 11.1–15.3)
SODIUM BLD-SCNC: 139 MMOL/L (ref 137–145)
TROPONIN I SERPL-MCNC: 2.2 NG/ML
VIM STRAIN: NOT DETECTED

## 2018-01-04 PROCEDURE — 87081 CULTURE SCREEN ONLY: CPT | Performed by: INTERNAL MEDICINE

## 2018-01-04 PROCEDURE — 84484 ASSAY OF TROPONIN QUANT: CPT | Performed by: NURSE PRACTITIONER

## 2018-01-04 PROCEDURE — 85610 PROTHROMBIN TIME: CPT | Performed by: INTERNAL MEDICINE

## 2018-01-04 PROCEDURE — G0378 HOSPITAL OBSERVATION PER HR: HCPCS

## 2018-01-04 PROCEDURE — 0 TECHNETIUM ALBUMIN AGGREGATED: Performed by: NURSE PRACTITIONER

## 2018-01-04 PROCEDURE — A9540 TC99M MAA: HCPCS | Performed by: NURSE PRACTITIONER

## 2018-01-04 PROCEDURE — 0 TECHNETIUM TC 99M PENTETATE KIT: Performed by: INTERNAL MEDICINE

## 2018-01-04 PROCEDURE — 78582 LUNG VENTILAT&PERFUS IMAGING: CPT

## 2018-01-04 PROCEDURE — 80048 BASIC METABOLIC PNL TOTAL CA: CPT | Performed by: INTERNAL MEDICINE

## 2018-01-04 PROCEDURE — A9539 TC99M PENTETATE: HCPCS | Performed by: INTERNAL MEDICINE

## 2018-01-04 PROCEDURE — 25010000002 POTASSIUM CHLORIDE PER 2 MEQ OF POTASSIUM: Performed by: NURSE PRACTITIONER

## 2018-01-04 PROCEDURE — 83735 ASSAY OF MAGNESIUM: CPT | Performed by: INTERNAL MEDICINE

## 2018-01-04 RX ORDER — ASPIRIN 81 MG/1
81 TABLET ORAL DAILY
Status: DISCONTINUED | OUTPATIENT
Start: 2018-01-04 | End: 2018-01-05 | Stop reason: HOSPADM

## 2018-01-04 RX ORDER — NALOXONE HCL 0.4 MG/ML
0.4 VIAL (ML) INJECTION
Status: DISCONTINUED | OUTPATIENT
Start: 2018-01-04 | End: 2018-01-05 | Stop reason: HOSPADM

## 2018-01-04 RX ORDER — POTASSIUM CHLORIDE 7.45 MG/ML
10 INJECTION INTRAVENOUS
Status: DISCONTINUED | OUTPATIENT
Start: 2018-01-04 | End: 2018-01-05 | Stop reason: HOSPADM

## 2018-01-04 RX ORDER — ALLOPURINOL 300 MG/1
300 TABLET ORAL DAILY
Status: DISCONTINUED | OUTPATIENT
Start: 2018-01-04 | End: 2018-01-05 | Stop reason: HOSPADM

## 2018-01-04 RX ORDER — POTASSIUM CHLORIDE 750 MG/1
20 CAPSULE, EXTENDED RELEASE ORAL DAILY
Status: DISCONTINUED | OUTPATIENT
Start: 2018-01-04 | End: 2018-01-05 | Stop reason: HOSPADM

## 2018-01-04 RX ORDER — MORPHINE SULFATE 2 MG/ML
2 INJECTION, SOLUTION INTRAMUSCULAR; INTRAVENOUS EVERY 4 HOURS PRN
Status: DISCONTINUED | OUTPATIENT
Start: 2018-01-04 | End: 2018-01-05 | Stop reason: HOSPADM

## 2018-01-04 RX ORDER — CARVEDILOL 3.12 MG/1
3.12 TABLET ORAL 2 TIMES DAILY WITH MEALS
Status: DISCONTINUED | OUTPATIENT
Start: 2018-01-04 | End: 2018-01-05 | Stop reason: HOSPADM

## 2018-01-04 RX ORDER — AMIODARONE HYDROCHLORIDE 200 MG/1
200 TABLET ORAL
Status: DISCONTINUED | OUTPATIENT
Start: 2018-01-04 | End: 2018-01-05 | Stop reason: HOSPADM

## 2018-01-04 RX ORDER — WARFARIN SODIUM 2.5 MG/1
2.5 TABLET ORAL
Status: DISCONTINUED | OUTPATIENT
Start: 2018-01-04 | End: 2018-01-05 | Stop reason: HOSPADM

## 2018-01-04 RX ORDER — ENALAPRIL MALEATE 5 MG/1
5 TABLET ORAL DAILY
Status: DISCONTINUED | OUTPATIENT
Start: 2018-01-04 | End: 2018-01-05 | Stop reason: HOSPADM

## 2018-01-04 RX ORDER — SODIUM CHLORIDE 0.9 % (FLUSH) 0.9 %
1-10 SYRINGE (ML) INJECTION AS NEEDED
Status: DISCONTINUED | OUTPATIENT
Start: 2018-01-04 | End: 2018-01-05 | Stop reason: HOSPADM

## 2018-01-04 RX ORDER — LEVOTHYROXINE SODIUM 0.03 MG/1
25 TABLET ORAL
Status: DISCONTINUED | OUTPATIENT
Start: 2018-01-04 | End: 2018-01-05 | Stop reason: HOSPADM

## 2018-01-04 RX ADMIN — ENALAPRIL MALEATE 5 MG: 5 TABLET ORAL at 08:27

## 2018-01-04 RX ADMIN — ASPIRIN 81 MG: 81 TABLET, COATED ORAL at 08:27

## 2018-01-04 RX ADMIN — LEVOTHYROXINE SODIUM 25 MCG: 25 TABLET ORAL at 05:55

## 2018-01-04 RX ADMIN — POTASSIUM CHLORIDE: 149 INJECTION, SOLUTION, CONCENTRATE INTRAVENOUS at 14:53

## 2018-01-04 RX ADMIN — AMIODARONE HYDROCHLORIDE 200 MG: 200 TABLET ORAL at 08:27

## 2018-01-04 RX ADMIN — ALLOPURINOL 300 MG: 300 TABLET ORAL at 08:27

## 2018-01-04 RX ADMIN — Medication 10 ML: at 14:53

## 2018-01-04 RX ADMIN — POTASSIUM CHLORIDE 20 MEQ: 750 CAPSULE, EXTENDED RELEASE ORAL at 08:27

## 2018-01-04 RX ADMIN — KIT FOR THE PREPARATION OF TECHNETIUM TC 99M PENTETATE 1 DOSE: 20 INJECTION, POWDER, LYOPHILIZED, FOR SOLUTION INTRAVENOUS; RESPIRATORY (INHALATION) at 13:19

## 2018-01-04 RX ADMIN — CARVEDILOL 3.12 MG: 3.12 TABLET, FILM COATED ORAL at 08:27

## 2018-01-04 RX ADMIN — WARFARIN SODIUM 2.5 MG: 2.5 TABLET ORAL at 17:42

## 2018-01-04 RX ADMIN — Medication 1 DOSE: at 13:46

## 2018-01-04 RX ADMIN — CARVEDILOL 3.12 MG: 3.12 TABLET, FILM COATED ORAL at 17:42

## 2018-01-04 NOTE — NURSING NOTE
Patient has excoriation buttocks and coccyx region.  Needs good skin care, protection and off loading. Jenn Simons will ordered Magic Butt Cream.

## 2018-01-04 NOTE — PROGRESS NOTES
"Anticoagulation by Pharmacy - Warfarin    Lucien Stone is a 85 y.o.male  [Ht: 162.6 cm (64\"); Wt: 70.9 kg (156 lb 4.9 oz)] on Warfarin 2.5 mg PO  for indication of Cardiac Dysrhythmia.    Goal INR: 2-3  Today's INR:   Lab Results   Component Value Date    INR 2.47 (H) 01/04/2018         Lab Results   Component Value Date    INR 2.47 (H) 01/04/2018    INR 2.33 (H) 01/04/2018    INR 2.42 (H) 01/03/2018    PROTIME 27.0 (H) 01/04/2018    PROTIME 25.8 (H) 01/04/2018    PROTIME 26.6 (H) 01/03/2018     Lab Results   Component Value Date    HGB 9.8 (L) 01/03/2018     Lab Results   Component Value Date    HCT 29.7 (L) 01/03/2018     Lab Results   Component Value Date     01/03/2018     Assessment/Plan:  Reviewed above labs. INR is 2.47.  INR is at goal. Reviewed medication list. Concurrent medications include allopurinol, amiodarone, aspirin and levothyroxine.  Will continue current dose of  2.5 mg. Rx will continue to follow and adjust dose accordingly.     Tiffanie Dhillon [facility]  01/04/18 10:49 AM     "

## 2018-01-04 NOTE — DISCHARGE PLACEMENT REQUEST
"Lucien Clemente (85 y.o. Male)     Date of Birth Social Security Number Address Home Phone MRN    01/28/1932  [id number]  [place] [address] [phone] [id number]    Anabaptism Marital Status          Presybeterian        Admission Date Admission Type Admitting Provider Attending Provider Department, Room/Bed    1/3/18 Emergency Evan Dawson MD Popescu, Tudor, MD [address], 357/1    Discharge Date Discharge Disposition Discharge Destination                      Attending Provider: Evan Dawson MD     Allergies:  No Known Allergies    Isolation:  None   Infection:  None   Code Status:  FULL    Ht:  162.6 cm (64\")   Wt:  70.9 kg (156 lb 4.9 oz)    Admission Cmt:  None   Principal Problem:  AICD discharge [Z45.02]                 Active Insurance as of 1/3/2018     Primary Coverage     Payor Plan Insurance Group Employer/Plan Group    MEDICARE MEDICARE A & B      Payor Plan Address Payor Plan Phone Number Effective From Effective To    [address] [phone] 1/1/1997     [place] [address]       Subscriber Name Subscriber Birth Date Member ID       LUCIEN CLEMENTE 1/28/1932 [id number]           Secondary Coverage     Payor Plan Insurance Group Employer/Plan Group    [employer] [place] [employer] [place] [id number]     Payor Plan Address Payor Plan Phone Number Effective From Effective To    [employer] [place] [address] [phone] 1/1/2011     [place] [address]       Subscriber Name Subscriber Birth Date Member ID       LUCIEN CLEMENTE 1/28/1932 [id number]                 Emergency Contacts      (Rel.) Home Phone Work Phone Mobile Phone    Shravan Cruz (Power of ) [phone]              "

## 2018-01-04 NOTE — ED NOTES
Spoke with a [employer] representative Santhosh, who stated that the patients Pacemaker/defibrrillator was callibrated on 9/25/2017; since then the device has detected 13 episodes: 10 of which were today. 3 of the 10 episodes today were shocked, the last rhythm required multiply shocks to normalize. Device reportably starts looking at a pulse 130 beats/min. or abnormal rhythm. A fax containing this information is being sent, technically difficulties with fax, in contact with [employer] Santhosh nguyen. Will attach faxed information to chart once obtained.       Yamilka Farfan RN  01/03/18 7726

## 2018-01-04 NOTE — ED TRIAGE NOTES
Patient presents to ED with c/o his defibrillator has firing 5 times today.  Patient has no other complaint except for little bit of chest soreness after the last defibrillation.  He says he has a defibrillator for a weak heart.  He has no other complaints.

## 2018-01-04 NOTE — PLAN OF CARE
Problem: Patient Care Overview (Adult)  Goal: Plan of Care Review  Outcome: Ongoing (interventions implemented as appropriate)   01/04/18 0333   Coping/Psychosocial Response Interventions   Plan Of Care Reviewed With patient   Patient Care Overview   Progress no change   Outcome Evaluation   Outcome Summary/Follow up Plan new admit, no c/o pain, wound consult for bottom     Goal: Adult Individualization and Mutuality  Outcome: Ongoing (interventions implemented as appropriate)    Goal: Discharge Needs Assessment  Outcome: Ongoing (interventions implemented as appropriate)      Problem: Pain, Acute (Adult)  Goal: Identify Related Risk Factors and Signs and Symptoms  Outcome: Ongoing (interventions implemented as appropriate)    Goal: Acceptable Pain Control/Comfort Level  Outcome: Ongoing (interventions implemented as appropriate)      Problem: Cardiac Rhythm Management Device (Adult)  Goal: Signs and Symptoms of Listed Potential Problems Will be Absent or Manageable (Cardiac Rhythm Management Device)  Outcome: Ongoing (interventions implemented as appropriate)

## 2018-01-04 NOTE — ED PROVIDER NOTES
Subjective   History of Present Illness  Patient is an 85-year-old male who was brought here by [facility] although he goes to [facility] for all of his medical care.  Apparently his defibrillator has fired 5 times today.  Patient has no other complaint except for little bit of chest soreness after the last defibrillation.  He says he has a defibrillator for a weak heart.  He has no other complaints.  Review of Systems   Constitutional: Negative for appetite change, chills, diaphoresis, fatigue and fever.   Respiratory: Negative for apnea, cough, choking, chest tightness, shortness of breath, wheezing and stridor.    Cardiovascular: Positive for chest pain. Negative for palpitations and leg swelling.   All other systems reviewed and are negative.      Past Medical History:   Diagnosis Date   • Bleeding disorder    • Cancer    • Chronic kidney disease    • History of transfusion    • Hypertension    • Myocardial infarction        No Known Allergies    Past Surgical History:   Procedure Laterality Date   • ANGIOPLASTY     • CHOLECYSTECTOMY     • CORONARY STENT PLACEMENT     • HERNIA REPAIR         Family History   Problem Relation Age of Onset   • No Known Problems Mother    • Stroke Father    • Cancer Brother    • No Known Problems Maternal Grandmother    • No Known Problems Maternal Grandfather    • No Known Problems Paternal Grandmother    • No Known Problems Paternal Grandfather        Social History     Social History   • Marital status:      Spouse name: N/A   • Number of children: N/A   • Years of education: N/A     Social History Main Topics   • Smoking status: Never Smoker   • Smokeless tobacco: Not on file   • Alcohol use No   • Drug use: No   • Sexual activity: No     Other Topics Concern   • Not on file     Social History Narrative           Objective   Physical Exam   Constitutional: He is oriented to person, place, and time. He appears well-developed and well-nourished. No distress.   HENT:    Head: Normocephalic and atraumatic.   Mouth/Throat: Oropharynx is clear and moist.   Eyes: EOM are normal. Pupils are equal, round, and reactive to light.   Neck: Normal range of motion. Neck supple.   Cardiovascular: Normal rate, regular rhythm and normal heart sounds.    Pulmonary/Chest: Effort normal and breath sounds normal. No respiratory distress. He has no wheezes. He has no rales. He exhibits no tenderness.   Musculoskeletal: Normal range of motion. He exhibits no edema or tenderness.   Neurological: He is alert and oriented to person, place, and time. No cranial nerve deficit.   Skin: He is not diaphoretic.   Nursing note and vitals reviewed.      Procedures         ED Course  ED Course          Patient's [employer] Ace maker was interrogated.  Apparently he had multiple episodes of heart regard of 130 today and 5 firings.  3 of them in rapid succession this evening.  Imaging and lab work were reviewed.  She does have a history of CHF per his nephew.  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    Optimally he should've been taken to [facility] when EMS picked him up especially in light of the fact that all of his doctors of their records are there and his family requested it.  Given that he has an abnormal troponin and a concerning presentation feel that he is not stable enough to be transported tonight to [facility].  I have consulted the hospitalist service.        MDM  Number of Diagnoses or Management Options  Elevated troponin: new and requires workup  Tachycardia: established and worsening     Amount and/or Complexity of Data Reviewed  Clinical lab tests: reviewed and ordered  Tests in the radiology section of CPT®: reviewed and ordered  Tests in the medicine section of CPT®: reviewed and ordered  Decide to obtain previous medical records or to obtain history from someone other than the patient: yes  Obtain history from someone other than the patient: yes  Review and summarize past medical  records: yes  Independent visualization of images, tracings, or specimens: yes    Risk of Complications, Morbidity, and/or Mortality  Presenting problems: high  Diagnostic procedures: high  Management options: high        Final diagnoses:   Elevated troponin   Tachycardia            Joey Almaguer MD  01/03/18 4723

## 2018-01-04 NOTE — PLAN OF CARE
Problem: Fall Risk (Adult)  Goal: Identify Related Risk Factors and Signs and Symptoms  Outcome: Ongoing (interventions implemented as appropriate)   01/04/18 3836   Fall Risk   Fall Risk: Related Risk Factors age-related changes;fatigue/slow reaction;environment unfamiliar   Fall Risk: Signs and Symptoms presence of risk factors     Goal: Absence of Falls  Outcome: Ongoing (interventions implemented as appropriate)

## 2018-01-04 NOTE — CONSULTS
Adult Nutrition  Assessment    Patient Name:  Lucien Stone  YOB: 1932  MRN: [id number]  Admit Date:  1/3/2018    Assessment Date:  1/4/2018    Comments:  RD visit r/t education re: low na diet guidelines. Pt reports someone cooks/shops for him and he eats out often. RD reviewed low na diet guidelines and enc fresh fruits/vegs/meats and using mrs santizo for seasoning. Enc pt to share info w/family who prepares his meals.           Reason for Assessment       01/04/18 1132    Reason for Assessment    Reason For Assessment/Visit education    Diagnosis Diagnosis    Cardiac CHF                              Electronically signed by:  Priscilla Robertson RD  01/04/18 11:33 AM

## 2018-01-04 NOTE — H&P
History and Physical  Evan Dawson MD  Hospitalist      Patient Care Team:  RONEL Ruiz as PCP - General (Family Medicine)    Chief complaint   Chief Complaint   Patient presents with   • AICD shocks     Subjective     Patient is a 85 y.o. male admitted for recurrent shocks from his AICD. He has received several shocks today, some quite painful. He has had the AICD for the last 3 or 4 years and so far hasn't experienced any electrical shocks from the device until now.    He follows up with his Internists and Cardiologists at [facility] in [place], [place] - he ended up in our ER as the ambulance brought him here.    History  Past Medical History:   Diagnosis Date   • CAD (coronary artery disease)    • Chronic systolic heart failure    • Hypertension    • Ischemic cardiomyopathy    • Multiple myeloma    • Myocardial infarction    • Ventricular tachyarrhythmia      Past Surgical History:   Procedure Laterality Date   • CARDIAC DEFIBRILLATOR PLACEMENT     • CHOLECYSTECTOMY     • CORONARY STENT PLACEMENT       Family History   Problem Relation Age of Onset   • Stroke Father      Social History   Substance Use Topics   • Smoking status: Never Smoker   • Smokeless tobacco: None   • Alcohol use No     Prescriptions Prior to Admission   Medication Sig Dispense Refill Last Dose   • allopurinol (ZYLOPRIM) 300 MG tablet TK 1 T PO QD  5 Taking   • amiodarone (PACERONE) 200 MG tablet TK 1 T PO D  5 Taking   • carvedilol (COREG) 3.125 MG tablet TK 1 T PO BID WITH MEALS  5 Taking   • enalapril (VASOTEC) 10 MG tablet Take 5 mg by mouth Daily. 1/2 tab daily   Taking   • potassium chloride (MICRO-K) 10 MEQ CR capsule TK ONE C PO  D WITH FOOD  2 Taking   • SYNTHROID 25 MCG tablet TK 1 T PO D  0 Taking   • warfarin (COUMADIN) 2.5 MG tablet TK 1 T PO QD UTD BY YOUR DOCTOR  2 Taking     Allergies:  Review of patient's allergies indicates no known allergies.    Review of Systems  Review of Systems   Constitutional:  Positive for fatigue. Negative for fever.   Respiratory: Positive for shortness of breath. Negative for cough and wheezing.    Cardiovascular: Positive for chest pain and palpitations. Negative for leg swelling.   Gastrointestinal: Negative for abdominal distention, abdominal pain, constipation, nausea and vomiting.   Genitourinary: Negative for frequency and urgency.   Musculoskeletal: Positive for gait problem. Negative for arthralgias, back pain and neck pain.   Skin: Positive for pallor.   Neurological: Positive for dizziness and weakness. Negative for syncope and numbness.   Psychiatric/Behavioral: Negative for agitation, behavioral problems and confusion.   All other systems reviewed and are negative.      Objective     Vital Signs  Temp:  [96.5 °F (35.8 °C)-98.6 °F (37 °C)] 96.5 °F (35.8 °C)  Heart Rate:  [54-75] 66  Resp:  [17-18] 18  BP: (118-160)/(58-68) 153/67    Physical Exam:  Physical Exam   Constitutional: He is oriented to person, place, and time. He appears well-developed and well-nourished. No distress.   HENT:   Head: Normocephalic and atraumatic.   Eyes: EOM are normal. Pupils are equal, round, and reactive to light.   Neck: Normal range of motion. Neck supple.   Cardiovascular: Normal rate and regular rhythm.    Pulmonary/Chest: Effort normal and breath sounds normal.   Abdominal: Soft. He exhibits no distension. There is no tenderness.   Musculoskeletal: Normal range of motion. He exhibits no edema or tenderness.   Lymphadenopathy:     He has no cervical adenopathy.   Neurological: He is alert and oriented to person, place, and time. He has normal reflexes. No cranial nerve deficit.   Skin: Skin is warm and dry. There is pallor.   Psychiatric: He has a normal mood and affect. His behavior is normal.   Vitals reviewed.      Results Review:   Lab Results (last 24 hours)     Procedure Component Value Units Date/Time    Myoglobin, Serum [[id number]] Collected:  01/03/18 8853    Specimen:  Blood  Updated:  01/03/18 2242    CBC & Differential [[id number]] Collected:  01/03/18 2237    Specimen:  Blood Updated:  01/03/18 2252    Narrative:       The following orders were created for panel order CBC & Differential.  Procedure                               Abnormality         Status                     ---------                               -----------         ------                     CBC Auto Differential[[id number]]        Abnormal            Final result                 Please view results for these tests on the individual orders.    CBC Auto Differential [[id number]]  (Abnormal) Collected:  01/03/18 2237    Specimen:  Blood Updated:  01/03/18 2252     WBC 6.51 10*3/mm3      RBC 3.15 (L) 10*6/mm3      Hemoglobin 9.8 (L) g/dL      Hematocrit 29.7 (L) %      MCV 94.3 fL      MCH 31.1 pg      MCHC 33.0 g/dL      RDW 17.7 (H) %      RDW-SD 61.8 (H) fl      MPV 10.9 fL      Platelets 159 10*3/mm3      Neutrophil % 64.5 %      Lymphocyte % 21.0 %      Monocyte % 12.9 (H) %      Eosinophil % 0.8 %      Basophil % 0.5 %      Immature Grans % 0.3 %      Neutrophils, Absolute 4.20 10*3/mm3      Lymphocytes, Absolute 1.37 10*3/mm3      Monocytes, Absolute 0.84 10*3/mm3      Eosinophils, Absolute 0.05 10*3/mm3      Basophils, Absolute 0.03 10*3/mm3      Immature Grans, Absolute 0.02 10*3/mm3     Comprehensive Metabolic Panel [[id number]]  (Abnormal) Collected:  01/03/18 2237    Specimen:  Blood Updated:  01/03/18 2304     Glucose 113 (H) mg/dL      BUN 26 (H) mg/dL      Creatinine 1.27 mg/dL      Sodium 141 mmol/L      Potassium 3.6 mmol/L      Chloride 102 mmol/L      CO2 30.0 mmol/L      Calcium 8.6 mg/dL      Total Protein 5.8 (L) g/dL      Albumin 3.10 (L) g/dL      ALT (SGPT) 37 U/L      AST (SGOT) 30 U/L      Alkaline Phosphatase 58 U/L      Total Bilirubin 0.2 mg/dL      eGFR Non African Amer 54 (L) mL/min/1.73      Globulin 2.7 gm/dL      A/G Ratio 1.1 g/dL      BUN/Creatinine Ratio 20.5     Anion Gap 9.0 mmol/L      Narrative:       The MDRD GFR formula is only valid for adults with stable renal function between ages 18 and 70.    Protime-INR [[id number]]  (Abnormal) Collected:  01/03/18 2237    Specimen:  Blood Updated:  01/03/18 2304     Protime 26.6 (H) Seconds      INR 2.42 (H)    Narrative:       Therapeutic range for most indications is 2.0-3.0 INR,  or 2.5-3.5 for mechanical heart valves.    aPTT [[id number]]  (Normal) Collected:  01/03/18 2237    Specimen:  Blood Updated:  01/03/18 2304     PTT 39.5 seconds     Narrative:       The recommended Heparin therapeutic range is 68-97 seconds.    D-dimer, Quantitative [[id number]]  (Abnormal) Collected:  01/03/18 2237    Specimen:  Blood Updated:  01/03/18 2306     D-Dimer, Quantitative 1051 (H) ng/mL (FEU)     Narrative:       Dimer values <500 ng/ml FEU are FDA approved as aid in diagnosis of deep venous thrombosis and pulmonary embolism.  This test should not be used in an exclusion strategy with pretest probability alone.    A recent guideline regarding diagnosis for pulmonary thomboembolism recommends an adjusted exclusion criterion of age x 10 ng/ml FEU for patients >50 years of age ([facility] Intern Med 2015; 163: 701-711).    BNP [[id number]]  (Abnormal) Collected:  01/03/18 2237    Specimen:  Blood Updated:  01/03/18 2315     proBNP 4970.0 (H) pg/mL     Troponin [[id number]]  (Abnormal) Collected:  01/03/18 2237    Specimen:  Blood Updated:  01/03/18 2324     Troponin I 0.507 (C) ng/mL     Light Blue Top [[id number]] Collected:  01/03/18 2237    Specimen:  Blood Updated:  01/03/18 2346     Extra Tube hold for add-on      Auto resulted       Green Top (Gel) [[id number]] Collected:  01/03/18 2237    Specimen:  Blood Updated:  01/03/18 2346     Extra Tube Hold for add-ons.      Auto resulted.       Lavender Top [[id number]] Collected:  01/03/18 2237    Specimen:  Blood Updated:  01/03/18 2346     Extra Tube hold for add-on      Auto resulted       [place] Draw [[id number]]  Collected:  01/03/18 2237    Specimen:  Blood Updated:  01/04/18 0123    Narrative:       The following orders were created for panel order [place] Draw.  Procedure                               Abnormality         Status                     ---------                               -----------         ------                     Light Blue Top[[id number]]                                   Final result               Green Top (Gel)[[id number]]                                  Final result               Lavender Top[[id number]]                                     Final result               Gold Top - SST[[id number]]                                                                Please view results for these tests on the individual orders.    Protime-INR [[id number]]  (Abnormal) Collected:  01/04/18 0137    Specimen:  Blood Updated:  01/04/18 0209     Protime 25.8 (H) Seconds      INR 2.33 (H)    Narrative:       Therapeutic range for most indications is 2.0-3.0 INR,  or 2.5-3.5 for mechanical heart valves.          Imaging Results (last 24 hours)     Procedure Component Value Units Date/Time    XR Chest 1 View [[id number]] Collected:  01/03/18 2220     Updated:  01/03/18 2233    Narrative:         Chest single view on  1/3/2018     CLINICAL INDICATION: Chest pain, defibrillator fired    COMPARISON: None    FINDINGS: Evaluation of the lungs is limited due to  overpenetration. Vascular calcification is noted in the aorta.  Single lead left subclavian pacemaker tip takes an unusual course  distally. This may be within the right ventricle but may extend  into the IVC. Consider follow-up chest CT to include the upper  abdomen to better evaluate position. There is minimal basilar  atelectasis or scarring. Lungs otherwise appear grossly clear.  Heart is within normal limits for size.      Impression:       1. Unusual positioning of the patient's AICD lead, consider  follow-up CT to better evaluate positioning.  2. Otherwise no acute  disease.    Electronically signed by:  Danny Lemon  1/3/2018 10:32 PM  CST Workstation: [id number]          Assessment/Plan     Principal Problem:    AICD discharge  Active Problems:    Ventricular tachyarrhythmia    Chest pain    Ischemic cardiomyopathy    Chronic systolic heart failure    CAD (coronary artery disease)    Monitor on Telemetry, consult Dr. Dean. Resume his home anti arrhythmic medications.    Evan Dawson MD  01/04/18  6:17 AM

## 2018-01-05 LAB — MYOGLOBIN SERPL-MCNC: 188 NG/ML (ref 28–72)

## 2018-01-05 NOTE — DISCHARGE SUMMARY
[facility]  DISCHARGE SUMMARY       Date of Admission: 1/3/2018  Date of Discharge:  1/4/2018  Primary Care Physician: RONEL Ash    Presenting Problem/History of Present Illness:  Tachycardia [R00.0]  Elevated troponin [R74.8]     Final Discharge Diagnoses:  Hospital Problem List     * (Principal)AICD discharge (Chronic)    CAD (coronary artery disease) (Chronic)    Ventricular tachyarrhythmia (Chronic)    Ischemic cardiomyopathy (Chronic)    Chronic systolic heart failure (Chronic)    Chest pain          Consults:   Consults     Date and Time Order Name Status Description    1/4/2018 0123 Inpatient Consult to Cardiology             Pertinent Test Results:   Lab Results (last 24 hours)     Procedure Component Value Units Date/Time    Myoglobin, Serum [[id number]] Collected:  01/03/18 2237    Specimen:  Blood Updated:  01/03/18 2242    CBC & Differential [[id number]] Collected:  01/03/18 2237    Specimen:  Blood Updated:  01/03/18 2252    Narrative:       The following orders were created for panel order CBC & Differential.  Procedure                               Abnormality         Status                     ---------                               -----------         ------                     CBC Auto Differential[[id number]]        Abnormal            Final result                 Please view results for these tests on the individual orders.    CBC Auto Differential [[id number]]  (Abnormal) Collected:  01/03/18 2237    Specimen:  Blood Updated:  01/03/18 2252     WBC 6.51 10*3/mm3      RBC 3.15 (L) 10*6/mm3      Hemoglobin 9.8 (L) g/dL      Hematocrit 29.7 (L) %      MCV 94.3 fL      MCH 31.1 pg      MCHC 33.0 g/dL      RDW 17.7 (H) %      RDW-SD 61.8 (H) fl      MPV 10.9 fL      Platelets 159 10*3/mm3      Neutrophil % 64.5 %      Lymphocyte % 21.0 %      Monocyte % 12.9 (H) %      Eosinophil % 0.8 %      Basophil % 0.5 %      Immature Grans %  0.3 %      Neutrophils, Absolute 4.20 10*3/mm3      Lymphocytes, Absolute 1.37 10*3/mm3      Monocytes, Absolute 0.84 10*3/mm3      Eosinophils, Absolute 0.05 10*3/mm3      Basophils, Absolute 0.03 10*3/mm3      Immature Grans, Absolute 0.02 10*3/mm3     Comprehensive Metabolic Panel [[id number]]  (Abnormal) Collected:  01/03/18 2237    Specimen:  Blood Updated:  01/03/18 2304     Glucose 113 (H) mg/dL      BUN 26 (H) mg/dL      Creatinine 1.27 mg/dL      Sodium 141 mmol/L      Potassium 3.6 mmol/L      Chloride 102 mmol/L      CO2 30.0 mmol/L      Calcium 8.6 mg/dL      Total Protein 5.8 (L) g/dL      Albumin 3.10 (L) g/dL      ALT (SGPT) 37 U/L      AST (SGOT) 30 U/L      Alkaline Phosphatase 58 U/L      Total Bilirubin 0.2 mg/dL      eGFR Non African Amer 54 (L) mL/min/1.73      Globulin 2.7 gm/dL      A/G Ratio 1.1 g/dL      BUN/Creatinine Ratio 20.5     Anion Gap 9.0 mmol/L     Narrative:       The MDRD GFR formula is only valid for adults with stable renal function between ages 18 and 70.    Protime-INR [[id number]]  (Abnormal) Collected:  01/03/18 2237    Specimen:  Blood Updated:  01/03/18 2304     Protime 26.6 (H) Seconds      INR 2.42 (H)    Narrative:       Therapeutic range for most indications is 2.0-3.0 INR,  or 2.5-3.5 for mechanical heart valves.    aPTT [[id number]]  (Normal) Collected:  01/03/18 2237    Specimen:  Blood Updated:  01/03/18 2304     PTT 39.5 seconds     Narrative:       The recommended Heparin therapeutic range is 68-97 seconds.    D-dimer, Quantitative [[id number]]  (Abnormal) Collected:  01/03/18 2237    Specimen:  Blood Updated:  01/03/18 2306     D-Dimer, Quantitative 1051 (H) ng/mL (FEU)     Narrative:       Dimer values <500 ng/ml FEU are FDA approved as aid in diagnosis of deep venous thrombosis and pulmonary embolism.  This test should not be used in an exclusion strategy with pretest probability alone.    A recent guideline regarding diagnosis for pulmonary thomboembolism  recommends an adjusted exclusion criterion of age x 10 ng/ml FEU for patients >50 years of age ([facility]eghan Intern Med 2015; 163: 701-711).    BNP [[id number]]  (Abnormal) Collected:  01/03/18 2237    Specimen:  Blood Updated:  01/03/18 2315     proBNP 4970.0 (H) pg/mL     Troponin [[id number]]  (Abnormal) Collected:  01/03/18 2237    Specimen:  Blood Updated:  01/03/18 2324     Troponin I 0.507 (C) ng/mL     Light Blue Top [[id number]] Collected:  01/03/18 2237    Specimen:  Blood Updated:  01/03/18 2346     Extra Tube hold for add-on      Auto resulted       Green Top (Gel) [[id number]] Collected:  01/03/18 2237    Specimen:  Blood Updated:  01/03/18 2346     Extra Tube Hold for add-ons.      Auto resulted.       Lavender Top [[id number]] Collected:  01/03/18 2237    Specimen:  Blood Updated:  01/03/18 2346     Extra Tube hold for add-on      Auto resulted       [place] Draw [[id number]] Collected:  01/03/18 2237    Specimen:  Blood Updated:  01/04/18 0123    Narrative:       The following orders were created for panel order [place] Draw.  Procedure                               Abnormality         Status                     ---------                               -----------         ------                     Light Blue Top[[id number]]                                   Final result               Green Top (Gel)[[id number]]                                  Final result               Lavender Top[[id number]]                                     Final result               Gold Top - SST[[id number]]                                                                Please view results for these tests on the individual orders.    Protime-INR [[id number]]  (Abnormal) Collected:  01/04/18 0137    Specimen:  Blood Updated:  01/04/18 0209     Protime 25.8 (H) Seconds      INR 2.33 (H)    Narrative:       Therapeutic range for most indications is 2.0-3.0 INR,  or 2.5-3.5 for mechanical heart valves.    Protime-INR [[id number]]  (Abnormal)  Collected:  01/04/18 0655    Specimen:  Blood Updated:  01/04/18 0750     Protime 27.0 (H) Seconds      INR 2.47 (H)    Narrative:       Therapeutic range for most indications is 2.0-3.0 INR,  or 2.5-3.5 for mechanical heart valves.    Basic Metabolic Panel [[id number]]  (Abnormal) Collected:  01/04/18 0655    Specimen:  Blood Updated:  01/04/18 0751     Glucose 84 mg/dL      BUN 26 (H) mg/dL      Creatinine 1.32 (H) mg/dL      Sodium 139 mmol/L      Potassium 3.2 (L) mmol/L      Chloride 104 mmol/L      CO2 28.0 mmol/L      Calcium 8.4 mg/dL      eGFR Non African Amer 52 mL/min/1.73      BUN/Creatinine Ratio 19.7     Anion Gap 7.0 mmol/L     Narrative:       The MDRD GFR formula is only valid for adults with stable renal function between ages 18 and 70.    Magnesium [[id number]]  (Normal) Collected:  01/04/18 0655    Specimen:  Blood Updated:  01/04/18 0751     Magnesium 1.8 mg/dL     CRE Screen by PCR - Swab, Large Intestine, Rectum [[id number]] Collected:  01/04/18 0631    Specimen:  Swab from Large Intestine, Rectum Updated:  01/04/18 0755     CRE SCREEN Not Detected      This test is performed by utilizing real time polymerace chain recation (PCR).         OXA 48 Strain Not Detected      Not Applicable.        IMP STRAIN Not Detected      Not Applicable        VIM STRAING Not Detected      Not Applicable        NDM Strain Not Detected      Not Applicable        KPC Strain Not Detected      Not Applicable       Troponin [[id number]]  (Abnormal) Collected:  01/04/18 0655    Specimen:  Blood Updated:  01/04/18 1321     Troponin I 2.200 (C) ng/mL         Imaging Results (last 24 hours)     Procedure Component Value Units Date/Time    XR Chest 1 View [[id number]] Collected:  01/03/18 2220     Updated:  01/03/18 2233    Narrative:         Chest single view on  1/3/2018     CLINICAL INDICATION: Chest pain, defibrillator fired    COMPARISON: None    FINDINGS: Evaluation of the lungs is limited due to  overpenetration.  Vascular calcification is noted in the aorta.  Single lead left subclavian pacemaker tip takes an unusual course  distally. This may be within the right ventricle but may extend  into the IVC. Consider follow-up chest CT to include the upper  abdomen to better evaluate position. There is minimal basilar  atelectasis or scarring. Lungs otherwise appear grossly clear.  Heart is within normal limits for size.      Impression:       1. Unusual positioning of the patient's AICD lead, consider  follow-up CT to better evaluate positioning.  2. Otherwise no acute disease.    Electronically signed by:  Danny Lemon  1/3/2018 10:32 PM  CST Workstation: [id number]    NM Lung Ventilation Perfusion [[id number]] Collected:  01/04/18 1318     Updated:  01/04/18 1459    Narrative:         Nuclear medicine ventilation perfusion lung scan    History: Elevated d-dimer. Chest pain.    Correlation: Portable chest January 3, 2018.    Following the inhalation of 25.1 mCi of aerosolized technetium  99m DTPA, multiple ventilation images obtained.    Following the intravenous administration of 5.0 millicuries of  technetium 99m MAA, multiple ventilation images obtained.    Some inhomogeneous distribution of radionuclide, more pronounced  on the ventilation images.  No mismatched perfusion defects to indicate pulmonary embolism.      Impression:       Conclusion:  Low probability for pulmonary embolism.    [id number]    Electronically signed by:  Can Yun MD  1/4/2018 2:58 PM CST  Workstation: [id number]          Chief Complaint on Day of Discharge: No complaints.     Hospital Course:  The patient is a 85 y.o. male who presented to [facility] by EMS due to AICD discharging.  The patient states he never had chest pain other than the pain he felt with AICD firing.   The patient sees Dr. Boyce with cardiology at [facility] in [place].  Troponin has remained positive, but is most likely due to to the discharge of  "AICD.  Currently there is no electrophysiology cardiologist coverage at [facility].  I have called [facility] and spoke with Dr. Mccarthy and the patient will be transferred there for further treatment.  Due to the elevated d-dimer, a VQ scan was completed that showed low probability for PE.  Patient was hypokalemic and that was also replaced.    Nursing note concerning AICD:  Spoke with a [employer] representative Santhosh, who stated that the patients Pacemaker/defibrrillator was callibrated on 9/25/2017; since then the device has detected 13 episodes: 10 of which were today. 3 of the 10 episodes today were shocked, the last rhythm required multiply shocks to normalize. Device reportably starts looking at a pulse 130 beats/min. or abnormal rhythm. A fax containing this information is being sent, technically difficulties with fax, in contact with [employer] Santhosh nguyen. Will attach faxed information to chart once obtained.       Condition on Discharge:  Stable    Physical Exam on Discharge:  /50 (BP Location: Left arm, Patient Position: Lying)  Pulse (!) 49  Temp 98.3 °F (36.8 °C) (Oral)   Resp 18  Ht 162.6 cm (64\")  Wt 70.9 kg (156 lb 4.9 oz)  SpO2 97%  BMI 26.83 kg/m2  Physical Exam   Constitutional: He is oriented to person, place, and time. He appears well-developed and well-nourished.   HENT:   Head: Normocephalic and atraumatic.   Eyes: EOM are normal. Pupils are equal, round, and reactive to light.   Neck: Normal range of motion. Neck supple.   Cardiovascular: Normal rate and regular rhythm.    Pulmonary/Chest: Effort normal.   Abdominal: Soft. Bowel sounds are normal.   Musculoskeletal: Normal range of motion.   Neurological: He is alert and oriented to person, place, and time.   Skin: Skin is warm and dry.   Psychiatric: He has a normal mood and affect.     Discharge Disposition:  Short Term Hospital (DC - External)    Discharge Medications:   Lucien Stone   Home Medication Instructions VIN:[id number]    " Printed on:01/04/18 2630   Medication Information                      allopurinol (ZYLOPRIM) 300 MG tablet  TK 1 T PO QD             amiodarone (PACERONE) 200 MG tablet  TK 1 T PO D             carvedilol (COREG) 3.125 MG tablet  TK 1 T PO BID WITH MEALS             enalapril (VASOTEC) 10 MG tablet  Take 5 mg by mouth Daily. 1/2 tab daily             potassium chloride (MICRO-K) 10 MEQ CR capsule  TK ONE C PO  D WITH FOOD             SYNTHROID 25 MCG tablet  TK 1 T PO D             warfarin (COUMADIN) 2.5 MG tablet  TK 1 T PO QD UTD BY YOUR DOCTOR                 Discharge Diet:   Diet Instructions     Diet: Cardiac       Discharge Diet:  Cardiac                 Activity at Discharge:   Activity Instructions     Activity as Tolerated                     Discharge Care Plan/Instructions: Patient will be transferred to [facility] under the care of Dr. Mccarthy.  Follow-up Appointments:   No future appointments.    Test Results Pending at Discharge:    Order Current Status    Myoglobin, Serum In process            This document has been electronically signed by RONEL Herrera on January 4, 2018 7:25 PM        Time: Greater than 30 minutes.

## 2018-08-07 ENCOUNTER — HOSPITAL ENCOUNTER (OUTPATIENT)
Facility: HOSPITAL | Age: 83
Setting detail: HOSPITAL OUTPATIENT SURGERY
End: 2018-08-07
Attending: INTERNAL MEDICINE | Admitting: INTERNAL MEDICINE

## 2019-07-31 ENCOUNTER — OFFICE VISIT (OUTPATIENT)
Dept: PODIATRY | Facility: CLINIC | Age: 84
End: 2019-07-31

## 2019-07-31 VITALS — HEIGHT: 64 IN | OXYGEN SATURATION: 99 % | BODY MASS INDEX: 26.63 KG/M2 | HEART RATE: 67 BPM | WEIGHT: 156 LBS

## 2019-07-31 DIAGNOSIS — I73.9 PVD (PERIPHERAL VASCULAR DISEASE) (HCC): ICD-10-CM

## 2019-07-31 DIAGNOSIS — L97.502: Primary | ICD-10-CM

## 2019-07-31 DIAGNOSIS — Z79.01 ANTICOAGULANT LONG-TERM USE: ICD-10-CM

## 2019-07-31 PROCEDURE — 99213 OFFICE O/P EST LOW 20 MIN: CPT | Performed by: PODIATRIST

## 2019-07-31 PROCEDURE — 11042 DBRDMT SUBQ TIS 1ST 20SQCM/<: CPT | Performed by: PODIATRIST

## 2020-01-06 ENCOUNTER — TRANSCRIBE ORDERS (OUTPATIENT)
Dept: WOUND CARE | Facility: HOSPITAL | Age: 85
End: 2020-01-06

## 2020-01-06 ENCOUNTER — HOSPITAL ENCOUNTER (OUTPATIENT)
Dept: GENERAL RADIOLOGY | Facility: HOSPITAL | Age: 85
Discharge: HOME | End: 2020-01-06
Admitting: NURSE PRACTITIONER

## 2020-01-06 ENCOUNTER — OFFICE VISIT (OUTPATIENT)
Dept: WOUND CARE | Facility: HOSPITAL | Age: 85
End: 2020-01-06

## 2020-01-06 ENCOUNTER — LAB (OUTPATIENT)
Dept: LAB | Facility: HOSPITAL | Age: 85
End: 2020-01-06

## 2020-01-06 DIAGNOSIS — I70.245 ATHSCL NATIVE ARTERIES OF LEFT LEG W ULCERATION OTH PRT FOOT (HCC): ICD-10-CM

## 2020-01-06 DIAGNOSIS — I70.245 ATHSCL NATIVE ARTERIES OF LEFT LEG W ULCERATION OTH PRT FOOT (HCC): Primary | ICD-10-CM

## 2020-01-06 DIAGNOSIS — I73.9 PERIPHERAL VASCULAR DISEASE, UNSPECIFIED (HCC): Primary | ICD-10-CM

## 2020-01-06 LAB
ALBUMIN SERPL-MCNC: 4.2 G/DL (ref 3.5–5.2)
ALBUMIN/GLOB SERPL: 1.1 G/DL
ALP SERPL-CCNC: 77 U/L (ref 39–117)
ALT SERPL W P-5'-P-CCNC: 14 U/L (ref 1–41)
ANION GAP SERPL CALCULATED.3IONS-SCNC: 12.7 MMOL/L (ref 5–15)
AST SERPL-CCNC: 20 U/L (ref 1–40)
BASOPHILS # BLD AUTO: 0.07 10*3/MM3 (ref 0–0.2)
BASOPHILS NFR BLD AUTO: 0.8 % (ref 0–1.5)
BILIRUB SERPL-MCNC: 0.4 MG/DL (ref 0.2–1.2)
BUN BLD-MCNC: 46 MG/DL (ref 8–23)
BUN/CREAT SERPL: 26.4 (ref 7–25)
CALCIUM SPEC-SCNC: 10 MG/DL (ref 8.6–10.5)
CHLORIDE SERPL-SCNC: 94 MMOL/L (ref 98–107)
CO2 SERPL-SCNC: 31.3 MMOL/L (ref 22–29)
CREAT BLD-MCNC: 1.74 MG/DL (ref 0.76–1.27)
CRP SERPL-MCNC: 0.75 MG/DL (ref 0–0.5)
D-LACTATE SERPL-SCNC: 0.9 MMOL/L (ref 0.5–2)
DEPRECATED RDW RBC AUTO: 49.2 FL (ref 37–54)
EOSINOPHIL # BLD AUTO: 0.49 10*3/MM3 (ref 0–0.4)
EOSINOPHIL NFR BLD AUTO: 5.6 % (ref 0.3–6.2)
ERYTHROCYTE [DISTWIDTH] IN BLOOD BY AUTOMATED COUNT: 13.9 % (ref 12.3–15.4)
ERYTHROCYTE [SEDIMENTATION RATE] IN BLOOD: 54 MM/HR (ref 0–20)
GFR SERPL CREATININE-BSD FRML MDRD: 37 ML/MIN/1.73
GLOBULIN UR ELPH-MCNC: 3.7 GM/DL
GLUCOSE BLD-MCNC: 97 MG/DL (ref 65–99)
HCT VFR BLD AUTO: 33.1 % (ref 37.5–51)
HGB BLD-MCNC: 11.2 G/DL (ref 13–17.7)
IMM GRANULOCYTES # BLD AUTO: 0.02 10*3/MM3 (ref 0–0.05)
IMM GRANULOCYTES NFR BLD AUTO: 0.2 % (ref 0–0.5)
LYMPHOCYTES # BLD AUTO: 1.94 10*3/MM3 (ref 0.7–3.1)
LYMPHOCYTES NFR BLD AUTO: 22 % (ref 19.6–45.3)
MCH RBC QN AUTO: 32.8 PG (ref 26.6–33)
MCHC RBC AUTO-ENTMCNC: 33.8 G/DL (ref 31.5–35.7)
MCV RBC AUTO: 97.1 FL (ref 79–97)
MONOCYTES # BLD AUTO: 0.82 10*3/MM3 (ref 0.1–0.9)
MONOCYTES NFR BLD AUTO: 9.3 % (ref 5–12)
NEUTROPHILS # BLD AUTO: 5.47 10*3/MM3 (ref 1.7–7)
NEUTROPHILS NFR BLD AUTO: 62.1 % (ref 42.7–76)
NRBC BLD AUTO-RTO: 0.1 /100 WBC (ref 0–0.2)
PLATELET # BLD AUTO: 239 10*3/MM3 (ref 140–450)
PMV BLD AUTO: 11.4 FL (ref 6–12)
POTASSIUM BLD-SCNC: 4.4 MMOL/L (ref 3.5–5.2)
PROT SERPL-MCNC: 7.9 G/DL (ref 6–8.5)
RBC # BLD AUTO: 3.41 10*6/MM3 (ref 4.14–5.8)
SODIUM BLD-SCNC: 138 MMOL/L (ref 136–145)
WBC NRBC COR # BLD: 8.81 10*3/MM3 (ref 3.4–10.8)

## 2020-01-06 PROCEDURE — 85652 RBC SED RATE AUTOMATED: CPT

## 2020-01-06 PROCEDURE — 86140 C-REACTIVE PROTEIN: CPT

## 2020-01-06 PROCEDURE — 85025 COMPLETE CBC W/AUTO DIFF WBC: CPT

## 2020-01-06 PROCEDURE — 73630 X-RAY EXAM OF FOOT: CPT

## 2020-01-06 PROCEDURE — 83605 ASSAY OF LACTIC ACID: CPT

## 2020-01-06 PROCEDURE — 80053 COMPREHEN METABOLIC PANEL: CPT

## 2020-01-06 PROCEDURE — G0463 HOSPITAL OUTPT CLINIC VISIT: HCPCS

## 2020-01-06 PROCEDURE — 36415 COLL VENOUS BLD VENIPUNCTURE: CPT

## 2020-01-16 ENCOUNTER — OUTSIDE FACILITY SERVICE (OUTPATIENT)
Dept: PODIATRY | Facility: CLINIC | Age: 85
End: 2020-01-16

## 2020-01-16 ENCOUNTER — OFFICE VISIT (OUTPATIENT)
Dept: WOUND CARE | Facility: HOSPITAL | Age: 85
End: 2020-01-16

## 2020-01-16 PROCEDURE — 99212 OFFICE O/P EST SF 10 MIN: CPT | Performed by: PODIATRIST

## 2020-01-16 PROCEDURE — G0463 HOSPITAL OUTPT CLINIC VISIT: HCPCS

## 2020-01-23 ENCOUNTER — OUTSIDE FACILITY SERVICE (OUTPATIENT)
Dept: PODIATRY | Facility: CLINIC | Age: 85
End: 2020-01-23

## 2020-01-23 ENCOUNTER — OFFICE VISIT (OUTPATIENT)
Dept: WOUND CARE | Facility: HOSPITAL | Age: 85
End: 2020-01-23

## 2020-01-23 PROCEDURE — 99212 OFFICE O/P EST SF 10 MIN: CPT | Performed by: PODIATRIST

## 2020-01-23 PROCEDURE — G0463 HOSPITAL OUTPT CLINIC VISIT: HCPCS

## 2020-02-13 ENCOUNTER — APPOINTMENT (OUTPATIENT)
Dept: WOUND CARE | Facility: HOSPITAL | Age: 85
End: 2020-02-13

## 2020-02-27 ENCOUNTER — APPOINTMENT (OUTPATIENT)
Dept: WOUND CARE | Facility: HOSPITAL | Age: 85
End: 2020-02-27